# Patient Record
Sex: MALE | Race: WHITE | Employment: FULL TIME | URBAN - METROPOLITAN AREA
[De-identification: names, ages, dates, MRNs, and addresses within clinical notes are randomized per-mention and may not be internally consistent; named-entity substitution may affect disease eponyms.]

---

## 2017-06-29 ENCOUNTER — OFFICE VISIT (OUTPATIENT)
Dept: URGENT CARE | Facility: URGENT CARE | Age: 27
End: 2017-06-29
Payer: COMMERCIAL

## 2017-06-29 VITALS
OXYGEN SATURATION: 99 % | TEMPERATURE: 98.1 F | DIASTOLIC BLOOD PRESSURE: 96 MMHG | WEIGHT: 187 LBS | SYSTOLIC BLOOD PRESSURE: 150 MMHG | HEART RATE: 82 BPM

## 2017-06-29 DIAGNOSIS — B85.3: Primary | ICD-10-CM

## 2017-06-29 DIAGNOSIS — Z11.3 SCREEN FOR STD (SEXUALLY TRANSMITTED DISEASE): ICD-10-CM

## 2017-06-29 PROCEDURE — 36415 COLL VENOUS BLD VENIPUNCTURE: CPT | Performed by: NURSE PRACTITIONER

## 2017-06-29 PROCEDURE — 87389 HIV-1 AG W/HIV-1&-2 AB AG IA: CPT | Performed by: NURSE PRACTITIONER

## 2017-06-29 PROCEDURE — 87591 N.GONORRHOEAE DNA AMP PROB: CPT | Performed by: NURSE PRACTITIONER

## 2017-06-29 PROCEDURE — 99202 OFFICE O/P NEW SF 15 MIN: CPT | Performed by: NURSE PRACTITIONER

## 2017-06-29 PROCEDURE — 86780 TREPONEMA PALLIDUM: CPT | Performed by: NURSE PRACTITIONER

## 2017-06-29 NOTE — MR AVS SNAPSHOT
After Visit Summary   6/29/2017    Fransisco Barton    MRN: [id number]           Patient Information     Date Of Birth          1990        Visit Information        Provider Department      6/29/2017 8:30 PM Yamila Covarrubias NP [facility]        Today's Diagnoses     Pediculosis pubis    -  1    Screen for STD (sexually transmitted disease)          Care Instructions      Pubic Lice  Lice or crabs are tiny insects that are about the size of a pinhead. Pubic lice infect the groin area. They cause bite marks and itching, especially at night. Lice lay eggs called nits that look like tiny white specks. The nits stick to pubic hairs. They don t brush away or wash off. To live, adult lice must feed on blood. Lice that fall off a person will die in 1 to 2 days.  Lice are easily spread by sexual contact with an infected person. But you can also get them from sharing personal items such as clothing, towels, and bedding.  Pubic lice are almost always in the pubic area. But pubic lice can be found in any hairy area. These areas include armpits, leg hair, chest, belly (abdomen), back, eyelashes, or eyebrows.  Pubic lice symptoms include:    Crawling feeling in your hair    Itching that is caused by an allergic reaction to the saliva of the lice (itching alone doesn't mean you have lice)    Red bumps around your pubic hair    Sores from scratching    You see the lice    Tiny brown specks in your underwear (lice feces)    Blood spots in your underwear    Faint blue spots on the skin from the lice feeding  Home care  Medicine  Pubic lice require medicine to kill the lice. There are both prescription and non-prescription medicines that can be used. When using the medicine, it must be used on the whole body, including the scalp.  There is an over-the-counter medicated cream rinse that is often used to treat head lice. It is usually effective. But lice are developing some resistance to it. If you  see live lice after a second treatment, talk to your healthcare provider.  Here are some cautions:    Don't use the medicine around your eyes. If it gets in your eyes, wash your eyes out thoroughly.    Don't use it inside the nose, ear, mouth, vagina, or on the eyebrows or eyelashes.    Talk with your provider before using this if you are pregnant or breastfeeding, or if you are planning to use it on a child younger than age 2.  If your provider recommends a medicated cream rinse, use it as follows:    Wash your hair with your regular shampoo.    Rinse with water and then towel dry. The towel will need to be washed. There could be lice on it.    Let your body cool down, if the shower was hot.    Put on enough medicated cream rinse to soak the entire body except as noted above.    Rinse well after 10 minutes. Leaving it on longer won't make it work better.    Once you have washed the medicine out of the hair, you are not finished. You still need to remove the nits using a special fine-toothed comb called a nit comb. You can usually get one in a pharmacy.    Stroke the comb through 1 section of the hair at a time, from scalp to hair tip. Clean the comb after each stroke.   When treating lice and nits on the eyebrows or eyelashes:    If there aren t a lot of lice, it is sometimes possible to remove them with a nit comb    Other treatments can be done using ophthalmic-grade petrolatum ointment. This is only available by prescription. Apply this to the edge of your eyelids 3 to 4 times a day for 10 days. This kills the lice by smothering them. Don t use regular petroleum-like ointments. They can irritate the eyes.  Medicine for symptoms  Itching probably causes the most discomfort. Over-the-counter antihistamines that have diphenhydramine are sold at pharmacies and grocery stores. Use an antihistamine in pill form, not a cream. If you were not given a prescription antihistamine, then you may use an over-the-counter  version to reduce itching if large areas of the skin are involved. This medicine may make you sleepy. So use lower doses during the day and higher doses at bedtime. Some antihistamines make you less sleepy. These are a good choice for daytime use. NOTE: Don't use antihistamines with diphenhydramine if you have glaucoma or if you are a man with trouble urinating due to an enlarged prostate.  You may have been given antibiotics because a bacterial infection developed (usually from scratching, not from the lice). Take the antibiotics until they are finished. It is important to finish them even if the wound looks better. This help make sure that the infection has cleared.  General care  As you treat your head lice, also follow these steps:    Machine-wash all your personal headwear, hats, scarves, coats, bed linens, and towels in hot water.    Dry on the hot cycle of the dryer. Any clothing, bed linens, or stuffed animals that can t be washed this way should be dry-cleaned or sealed in a plastic bag for 2 weeks. Lice will die during this time.    Eckert, brushes, barrettes, hair ties, and curlers may be treated with disinfectant or rubbing alcohol and then rinsed with clean water.    If possible, vacuum all rugs, carpets, and mattresses that were used while you were infected.    Sex partners and household members should be treated at the same time to prevent re-infection.    Avoid sexual contact until rechecked by your healthcare provider to confirm that all lice are gone.  Follow-up care  Follow up with your healthcare provider, or as advised. Call your provider if you are still itchy or if you see live lice in your pubic hair 7 days after the first treatment. If you have been infected with pubic lice, you should also be checked for other sexually transmitted diseases.  When to seek medical advice  Call your healthcare provider right away if any of these occur:    Itching gets worse and is not relieved by oral  "antihistamines    Increased tenderness, swelling of the skin, or pus draining from the bite    Trouble breathing  Date Last Reviewed: 10/1/2016    2525-2864 [employer]. [address] [place] [address], [place] [address]. All rights reserved. This information is not intended as a substitute for professional medical care. Always follow your healthcare professional's instructions.                Follow-ups after your visit        Who to contact     If you have questions or need follow up information about today's clinic visit or your schedule please contact [facility] directly at [phone].  Normal or non-critical lab and imaging results will be communicated to you by NP Photonics[employer], letter or phone within 4 business days after the clinic has received the results. If you do not hear from us within 7 days, please contact the clinic through NP Photonicshart or phone. If you have a critical or abnormal lab result, we will notify you by phone as soon as possible.  Submit refill requests through [employer] or call your pharmacy and they will forward the refill request to us. Please allow 3 business days for your refill to be completed.          Additional Information About Your Visit        MyChart Information     [employer] lets you send messages to your doctor, view your test results, renew your prescriptions, schedule appointments and more. To sign up, go to [email or web address] . Click on \"Log in\" on the left side of the screen, which will take you to the Welcome page. Then click on \"Sign up Now\" on the right side of the page.     You will be asked to enter the access code listed below, as well as some personal information. Please follow the directions to create your username and password.     Your access code is: [email or web address]  Expires: 2017  8:56 PM     Your access code will  in 90 days. If you need help or a new code, please call your [facility] or [phone].        Care EveryWhere ID     " This is your Care EveryWhere ID. This could be used by other organizations to access your [facility] medical records  [id number]        Your Vitals Were     Pulse Temperature Pulse Oximetry             82 98.1  F (36.7  C) (Oral) 99%          Blood Pressure from Last 3 Encounters:   06/29/17 (!) 150/96    Weight from Last 3 Encounters:   06/29/17 187 lb (84.8 kg)   05/14/10 155 lb (70.3 kg)              We Performed the Following     Anti Treponema     HIV Antigen Antibody Combo     Neisseria gonorrhoeae PCR          Today's Medication Changes          These changes are accurate as of: 6/29/17  8:56 PM.  If you have any questions, ask your nurse or doctor.               Start taking these medicines.        Dose/Directions    permethrin 1 % Liqd   Used for:  Pediculosis pubis   Started by:  Yamila Covarrubias, BENNETT        Apply to clean, towel-dried hair, saturate hair and scalp, wash off after 10 min.   Quantity:  60 mL   Refills:  1            Where to get your medicines      These medications were sent to [facility] [address] [place] [address] [address] [facility]  [address] [facility] [address]    Hours:  24-hours Phone:  [phone]     permethrin 1 % Liqd                Primary Care Provider Office Phone # Fax #    Jeevan Goins [phone]       [facility] [address] [facility] [address]        Equal Access to Services     MICHELLE YOU AH: Hadii aad ku hadasho Soomaali, waaxda luqadaha, qaybta kaalmada adeegyada, waxay brett us . So [facility] [phone].    ATENCIÓN: Si habla español, tiene a clark disposición servicios gratuitos de asistencia lingüística. Llame al [phone].    We comply with applicable federal civil rights laws and [place] laws. We do not discriminate on the basis of race, color, national origin, age, disability sex, sexual orientation or gender identity.            Thank  you!     Thank you for choosing [facility]  for your care. Our goal is always to provide you with excellent care. Hearing back from our patients is one way we can continue to improve our services. Please take a few minutes to complete the written survey that you may receive in the mail after your visit with us. Thank you!             Your Updated Medication List - Protect others around you: Learn how to safely use, store and throw away your medicines at www.disposemymeds.org.          This list is accurate as of: 6/29/17  8:56 PM.  Always use your most recent med list.                   Brand Name Dispense Instructions for use Diagnosis    ADVIL 200 MG capsule   Generic drug:  ibuprofen      1 CAPSULE EVERY 4 TO 6 HOURS AS NEEDED        EXCEDRIN PO      prn        permethrin 1 % Liqd     60 mL    Apply to clean, towel-dried hair, saturate hair and scalp, wash off after 10 min.    Pediculosis pubis

## 2017-06-29 NOTE — LETTER
[facility]  [address]  [place] [address]      July 3, 2017      Fransisco Barton  [address] [facility] [address]        Dear Fransisco,    Your results were negative, results attached below. Please contact clinic if you have any questions or concerns at [phone].      Sincerely,  Mary Sandhu PA-C/[place]    Results for orders placed or performed in visit on 06/29/17   Anti Treponema   Result Value Ref Range    Treponema pallidum Antibody Negative NEG   HIV Antigen Antibody Combo   Result Value Ref Range    HIV Antigen Antibody Combo  NR     Nonreactive   HIV-1 p24 Ag & HIV-1/HIV-2 Ab Not Detected     Neisseria gonorrhoeae PCR   Result Value Ref Range    Specimen Descrip       Urine  CORRECTED ON 07/02 AT 1003: PREVIOUSLY REPORTED AS Midstream Urine      N Gonorrhea PCR  NEG     Negative   Negative for N. gonorrhoeae rRNA by transcription mediated amplification.   A negative result by transcription mediated amplification does not preclude the   presence of N. gonorrhoeae infection because results are dependent on proper   and adequate collection, absence of inhibitors, and sufficient rRNA to be   detected.

## 2017-06-30 LAB — HIV 1+2 AB+HIV1 P24 AG SERPL QL IA: NORMAL

## 2017-06-30 NOTE — NURSING NOTE
Chief Complaint   Patient presents with     STD     Started 3 days ago. Pt states that he is having symptoms of Itchiness.        Initial BP (!) 150/96  Pulse 82  Temp 98.1  F (36.7  C) (Oral)  Wt 187 lb (84.8 kg)  SpO2 99% There is no height or weight on file to calculate BMI.  Medication Reconciliation: complete   Murray Childs MA

## 2017-06-30 NOTE — PATIENT INSTRUCTIONS
Pubic Lice  Lice or crabs are tiny insects that are about the size of a pinhead. Pubic lice infect the groin area. They cause bite marks and itching, especially at night. Lice lay eggs called nits that look like tiny white specks. The nits stick to pubic hairs. They don t brush away or wash off. To live, adult lice must feed on blood. Lice that fall off a person will die in 1 to 2 days.  Lice are easily spread by sexual contact with an infected person. But you can also get them from sharing personal items such as clothing, towels, and bedding.  Pubic lice are almost always in the pubic area. But pubic lice can be found in any hairy area. These areas include armpits, leg hair, chest, belly (abdomen), back, eyelashes, or eyebrows.  Pubic lice symptoms include:    Crawling feeling in your hair    Itching that is caused by an allergic reaction to the saliva of the lice (itching alone doesn't mean you have lice)    Red bumps around your pubic hair    Sores from scratching    You see the lice    Tiny brown specks in your underwear (lice feces)    Blood spots in your underwear    Faint blue spots on the skin from the lice feeding  Home care  Medicine  Pubic lice require medicine to kill the lice. There are both prescription and non-prescription medicines that can be used. When using the medicine, it must be used on the whole body, including the scalp.  There is an over-the-counter medicated cream rinse that is often used to treat head lice. It is usually effective. But lice are developing some resistance to it. If you see live lice after a second treatment, talk to your healthcare provider.  Here are some cautions:    Don't use the medicine around your eyes. If it gets in your eyes, wash your eyes out thoroughly.    Don't use it inside the nose, ear, mouth, vagina, or on the eyebrows or eyelashes.    Talk with your provider before using this if you are pregnant or breastfeeding, or if you are planning to use it on a  child younger than age 2.  If your provider recommends a medicated cream rinse, use it as follows:    Wash your hair with your regular shampoo.    Rinse with water and then towel dry. The towel will need to be washed. There could be lice on it.    Let your body cool down, if the shower was hot.    Put on enough medicated cream rinse to soak the entire body except as noted above.    Rinse well after 10 minutes. Leaving it on longer won't make it work better.    Once you have washed the medicine out of the hair, you are not finished. You still need to remove the nits using a special fine-toothed comb called a nit comb. You can usually get one in a pharmacy.    Stroke the comb through 1 section of the hair at a time, from scalp to hair tip. Clean the comb after each stroke.   When treating lice and nits on the eyebrows or eyelashes:    If there aren t a lot of lice, it is sometimes possible to remove them with a nit comb    Other treatments can be done using ophthalmic-grade petrolatum ointment. This is only available by prescription. Apply this to the edge of your eyelids 3 to 4 times a day for 10 days. This kills the lice by smothering them. Don t use regular petroleum-like ointments. They can irritate the eyes.  Medicine for symptoms  Itching probably causes the most discomfort. Over-the-counter antihistamines that have diphenhydramine are sold at pharmacies and grocery stores. Use an antihistamine in pill form, not a cream. If you were not given a prescription antihistamine, then you may use an over-the-counter version to reduce itching if large areas of the skin are involved. This medicine may make you sleepy. So use lower doses during the day and higher doses at bedtime. Some antihistamines make you less sleepy. These are a good choice for daytime use. NOTE: Don't use antihistamines with diphenhydramine if you have glaucoma or if you are a man with trouble urinating due to an enlarged prostate.  You may have been  given antibiotics because a bacterial infection developed (usually from scratching, not from the lice). Take the antibiotics until they are finished. It is important to finish them even if the wound looks better. This help make sure that the infection has cleared.  General care  As you treat your head lice, also follow these steps:    Machine-wash all your personal headwear, hats, scarves, coats, bed linens, and towels in hot water.    Dry on the hot cycle of the dryer. Any clothing, bed linens, or stuffed animals that can t be washed this way should be dry-cleaned or sealed in a plastic bag for 2 weeks. Lice will die during this time.    Eckert, brushes, barrettes, hair ties, and curlers may be treated with disinfectant or rubbing alcohol and then rinsed with clean water.    If possible, vacuum all rugs, carpets, and mattresses that were used while you were infected.    Sex partners and household members should be treated at the same time to prevent re-infection.    Avoid sexual contact until rechecked by your healthcare provider to confirm that all lice are gone.  Follow-up care  Follow up with your healthcare provider, or as advised. Call your provider if you are still itchy or if you see live lice in your pubic hair 7 days after the first treatment. If you have been infected with pubic lice, you should also be checked for other sexually transmitted diseases.  When to seek medical advice  Call your healthcare provider right away if any of these occur:    Itching gets worse and is not relieved by oral antihistamines    Increased tenderness, swelling of the skin, or pus draining from the bite    Trouble breathing  Date Last Reviewed: 10/1/2016    5170-6278 [employer]. [address] [place] [address], [place] [address]. All rights reserved. This information is not intended as a substitute for professional medical care. Always follow your healthcare professional's instructions.

## 2017-07-02 LAB
N GONORRHOEA DNA SPEC QL NAA+PROBE: NORMAL
SPECIMEN SOURCE: NORMAL
T PALLIDUM IGG+IGM SER QL: NEGATIVE

## 2018-05-08 ENCOUNTER — OFFICE VISIT (OUTPATIENT)
Dept: FAMILY MEDICINE | Facility: CLINIC | Age: 28
End: 2018-05-08
Payer: COMMERCIAL

## 2018-05-08 VITALS
WEIGHT: 193 LBS | BODY MASS INDEX: 27.63 KG/M2 | HEART RATE: 81 BPM | TEMPERATURE: 97.6 F | SYSTOLIC BLOOD PRESSURE: 130 MMHG | HEIGHT: 70 IN | DIASTOLIC BLOOD PRESSURE: 80 MMHG

## 2018-05-08 DIAGNOSIS — Z11.3 SCREEN FOR STD (SEXUALLY TRANSMITTED DISEASE): ICD-10-CM

## 2018-05-08 DIAGNOSIS — Z00.00 ROUTINE GENERAL MEDICAL EXAMINATION AT A HEALTH CARE FACILITY: Primary | ICD-10-CM

## 2018-05-08 DIAGNOSIS — Z29.9 PROPHYLACTIC MEASURE: ICD-10-CM

## 2018-05-08 DIAGNOSIS — Z23 NEED FOR TDAP VACCINATION: ICD-10-CM

## 2018-05-08 PROCEDURE — 99395 PREV VISIT EST AGE 18-39: CPT | Mod: 25 | Performed by: FAMILY MEDICINE

## 2018-05-08 PROCEDURE — 86695 HERPES SIMPLEX TYPE 1 TEST: CPT | Performed by: FAMILY MEDICINE

## 2018-05-08 PROCEDURE — 87389 HIV-1 AG W/HIV-1&-2 AB AG IA: CPT | Performed by: FAMILY MEDICINE

## 2018-05-08 PROCEDURE — 87491 CHLMYD TRACH DNA AMP PROBE: CPT | Performed by: FAMILY MEDICINE

## 2018-05-08 PROCEDURE — 86696 HERPES SIMPLEX TYPE 2 TEST: CPT | Performed by: FAMILY MEDICINE

## 2018-05-08 PROCEDURE — 90715 TDAP VACCINE 7 YRS/> IM: CPT | Performed by: FAMILY MEDICINE

## 2018-05-08 PROCEDURE — 86803 HEPATITIS C AB TEST: CPT | Performed by: FAMILY MEDICINE

## 2018-05-08 PROCEDURE — 86780 TREPONEMA PALLIDUM: CPT | Performed by: FAMILY MEDICINE

## 2018-05-08 PROCEDURE — 36415 COLL VENOUS BLD VENIPUNCTURE: CPT | Performed by: FAMILY MEDICINE

## 2018-05-08 PROCEDURE — 90471 IMMUNIZATION ADMIN: CPT | Performed by: FAMILY MEDICINE

## 2018-05-08 PROCEDURE — 87591 N.GONORRHOEAE DNA AMP PROB: CPT | Performed by: FAMILY MEDICINE

## 2018-05-08 ASSESSMENT — PAIN SCALES - GENERAL: PAINLEVEL: NO PAIN (0)

## 2018-05-08 NOTE — MR AVS SNAPSHOT
After Visit Summary   5/8/2018    Fransisco Barton    MRN: [id number]           Patient Information     Date Of Birth          1990        Visit Information        Provider Department      5/8/2018 11:20 AM Jeevan Goins MD [facility]        Today's Diagnoses     Need for Tdap vaccination    -  1    Screen for STD (sexually transmitted disease)        Prophylactic measure          Care Instructions      Preventive Health Recommendations  Male Ages 26 - 39    Yearly exam:             See your health care provider every year in order to  o   Review health changes.   o   Discuss preventive care.    o   Review your medicines if your doctor has prescribed any.    You should be tested each year for STDs (sexually transmitted diseases), if you re at risk.     After age 35, talk to your provider about cholesterol testing. If you are at risk for heart disease, have your cholesterol tested at least every 5 years.     If you are at risk for diabetes, you should have a diabetes test (fasting glucose).  Shots: Get a flu shot each year. Get a tetanus shot every 10 years.     Nutrition:    Eat at least 5 servings of fruits and vegetables daily.     Eat whole-grain bread, whole-wheat pasta and brown rice instead of white grains and rice.     Talk to your provider about Calcium and Vitamin D.     Lifestyle    Exercise for at least 150 minutes a week (30 minutes a day, 5 days a week). This will help you control your weight and prevent disease.     Limit alcohol to one drink per day.     No smoking.     Wear sunscreen to prevent skin cancer.     See your dentist every six months for an exam and cleaning.       Advise dental appointment    We will send you lab results    Keep working on healthy diet/exercise     Eye exam    If desired can call for appointment at infectious disease clinic            Follow-ups after your visit        Additional Services     INFECTIOUS DISEASE REFERRAL        "Your provider has referred you to: [facility]: [facility] (Infectious Disease and HIV Clinic) - [place] [phone]   [email or web address]    Please be aware that coverage of these services is subject to the terms and limitations of your health insurance plan.  Call member services at your health plan with any benefit or coverage questions.      Please bring the following with you to your appointment:    (1) Any X-Rays, CTs or MRIs which have been performed.  Contact the facility where they were done to arrange for  prior to your scheduled appointment.    (2) List of current medications   (3) This referral request   (4) Any documents/labs given to you for this referral                  Who to contact     If you have questions or need follow up information about today's clinic visit or your schedule please contact [facility] directly at [phone].  Normal or non-critical lab and imaging results will be communicated to you by MyChart, letter or phone within 4 business days after the clinic has received the results. If you do not hear from us within 7 days, please contact the clinic through [employer] or phone. If you have a critical or abnormal lab result, we will notify you by phone as soon as possible.  Submit refill requests through [employer] or call your pharmacy and they will forward the refill request to us. Please allow 3 business days for your refill to be completed.          Additional Information About Your Visit        Sun[employer] Information     [employer] lets you send messages to your doctor, view your test results, renew your prescriptions, schedule appointments and more. To sign up, go to [email or web address] . Click on \"Log in\" on the left side of the screen, which will take you to the Welcome page. Then click on \"Sign up Now\" on the right side of the page.     You will be asked to enter the access code listed " "below, as well as some personal information. Please follow the directions to create your username and password.     Your access code is: [email or web address]  Expires: 2018 11:42 AM     Your access code will  in 90 days. If you need help or a new code, please call your [place] clinic or [phone].        Care EveryWhere ID     This is your Care EveryWhere ID. This could be used by other organizations to access your [place] medical records  [id number]        Your Vitals Were     Pulse Temperature Height BMI (Body Mass Index)          81 97.6  F (36.4  C) (Oral) 5' 9.78\" (1.772 m) 27.87 kg/m2         Blood Pressure from Last 3 Encounters:   18 130/80   17 (!) 150/96    Weight from Last 3 Encounters:   18 193 lb (87.5 kg)   17 187 lb (84.8 kg)   05/14/10 155 lb (70.3 kg)              We Performed the Following     Chlamydia trachomatis PCR     Hepatitis C antibody     Herpes Simplex Virus 1 and 2 IgG     HIV Antigen Antibody Combo     INFECTIOUS DISEASE REFERRAL     Neisseria gonorrhoeae PCR     TDAP VACCINE (ADACEL)     Treponema Abs w Reflex to RPR and Titer     VACCINE ADMINISTRATION, INITIAL        Primary Care Provider Office Phone # Fax #    Jeevan Goins [phone]       [address] [facility] [address]        Equal Access to Services     [facility] AH: Hadii aad ku hadasho Soomaali, waaxda luqadaha, qaybta kaalmada adeegyada, kenny us . So [facility] [phone].    ATENCIÓN: Si habla español, tiene a clark disposición servicios gratuitos de asistencia lingüística. Llame al [phone].    We comply with applicable federal civil rights laws and [place] laws. We do not discriminate on the basis of race, color, national origin, age, disability, sex, sexual orientation, or gender identity.            Thank you!     Thank you for choosing [facility]  for your care. Our goal is always to provide you " with excellent care. Hearing back from our patients is one way we can continue to improve our services. Please take a few minutes to complete the written survey that you may receive in the mail after your visit with us. Thank you!             Your Updated Medication List - Protect others around you: Learn how to safely use, store and throw away your medicines at www.disposemymeds.org.          This list is accurate as of 5/8/18 11:42 AM.  Always use your most recent med list.                   Brand Name Dispense Instructions for use Diagnosis    ADVIL 200 MG capsule   Generic drug:  ibuprofen      1 CAPSULE EVERY 4 TO 6 HOURS AS NEEDED        EXCEDRIN PO      prn        nexIUM 20 MG CR capsule   Generic drug:  esomeprazole      Take 20 mg by mouth every morning (before breakfast) Take 30-60 minutes before eating.

## 2018-05-08 NOTE — PROGRESS NOTES
SUBJECTIVE:   CC: Fransisco Barton is an 27 year old male who presents for preventative health visit.     Healthy Habits:    Do you get at least three servings of calcium containing foods daily (dairy, green leafy vegetables, etc.)? yes    Amount of exercise or daily activities, outside of work: 1-2 day(s) per week    Problems taking medications regularly No    Medication side effects: No    Have you had an eye exam in the past two years? no    Do you see a dentist twice per year? no    Do you have sleep apnea, excessive snoring or daytime drowsiness?no       STD screening    No symptoms of stds    No history of stds except crabs    Otherwise feeling well        Today's PHQ-2 Score:   PHQ-2 ( 1999 Pfizer) 5/8/2018   Q1: Little interest or pleasure in doing things 0   Q2: Feeling down, depressed or hopeless 0   PHQ-2 Score 0       Abuse: Current or Past(Physical, Sexual or Emotional)- No  Do you feel safe in your environment - Yes    Social History   Substance Use Topics     Smoking status: Former Smoker     Types: Cigarettes     Smokeless tobacco: Never Used      Comment: ocaas     Alcohol use Yes      If you drink alcohol do you typically have >3 drinks per day or >7 drinks per week? Not Applicable                      Last PSA: No results found for: PSA    Reviewed orders with patient. Reviewed health maintenance and updated orders accordingly - Yes       Reviewed and updated as needed this visit by clinical staff  Tobacco  Allergies  Meds  Med Hx  Surg Hx  Fam Hx  Soc Hx        Reviewed and updated as needed this visit by Provider            ROS:  C: NEGATIVE for fever, chills, change in weight  I: NEGATIVE for worrisome rashes, moles or lesions  E: NEGATIVE for vision changes or irritation  ENT: NEGATIVE for ear, mouth and throat problems except for allergy symptoms.  Allegra or allegra d as needed.  R: NEGATIVE for significant cough or SOB  CV: NEGATIVE for chest pain, palpitations or peripheral  "edema  GI: NEGATIVE for nausea, abdominal pain, heartburn, or change in bowel habits   male: negative for dysuria, hematuria, decreased urinary stream, erectile dysfunction, urethral discharge  M: NEGATIVE for significant arthralgias or myalgia  N: NEGATIVE for weakness, dizziness or paresthesias  P: NEGATIVE for changes in mood or affect    Has gym membership, planning to go more    Last eye exam 2014        OBJECTIVE:   /80 (BP Location: Right arm, Patient Position: Chair, Cuff Size: Adult Regular)  Pulse 81  Temp 97.6  F (36.4  C) (Oral)  Ht 5' 9.78\" (1.772 m)  Wt 193 lb (87.5 kg)  BMI 27.87 kg/m2  EXAM:  GENERAL: healthy, alert and no distress  EYES: Eyes grossly normal to inspection, PERRL and conjunctivae and sclerae normal  HENT: ear canals and TM's normal, nose and mouth without ulcers or lesions  NECK: no adenopathy, no asymmetry, masses, or scars and thyroid normal to palpation  RESP: lungs clear to auscultation - no rales, rhonchi or wheezes  CV: regular rate and rhythm, normal S1 S2, no S3 or S4, no murmur, click or rub, no peripheral edema and peripheral pulses strong  ABDOMEN: soft, nontender, no hepatosplenomegaly, no masses and bowel sounds normal  MS: no gross musculoskeletal defects noted, no edema  SKIN: no suspicious lesions or rashes  NEURO: Normal strength and tone, mentation intact and speech normal  PSYCH: mentation appears normal, affect normal/bright    ASSESSMENT/PLAN:   Fransisco was seen today for physical and health maintenance.    Diagnoses and all orders for this visit:    Routine general medical examination at a health care facility    Need for Tdap vaccination  -     TDAP VACCINE (ADACEL)  -     VACCINE ADMINISTRATION, INITIAL    Screen for STD (sexually transmitted disease)  -     Chlamydia trachomatis PCR  -     Neisseria gonorrhoeae PCR  -     Hepatitis C antibody  -     HIV Antigen Antibody Combo  -     Herpes Simplex Virus 1 and 2 IgG  -     Treponema Abs w Reflex to " "RPR and Titer  -     INFECTIOUS DISEASE REFERRAL    Prophylactic measure  -     INFECTIOUS DISEASE REFERRAL    Other orders  -     Cancel: HIV Antigen Antibody Combo    Discussed multiple issues with patient  He is interested in propylactic meds to prevent hiv tranmission  Advised he see inf dis specialist for this. Referral done.  Patient to call and schedule.  tdap given.  Check labs  Patient to schedule dental and eye exams.  Keep working on healthy diet/exercise     COUNSELING:  Reviewed preventive health counseling, as reflected in patient instructions       Regular exercise       Healthy diet/nutrition       Vision screening       Safe sex practices/STD prevention    BP Screening:   Last 3 BP Readings:    BP Readings from Last 3 Encounters:   05/08/18 130/80   06/29/17 (!) 150/96       The following was recommended to the patient:  Re-screen BP within a year and recommended lifestyle modifications   reports that he has quit smoking. His smoking use included Cigarettes. He has never used smokeless tobacco.    Estimated body mass index is 27.87 kg/(m^2) as calculated from the following:    Height as of this encounter: 5' 9.78\" (1.772 m).    Weight as of this encounter: 193 lb (87.5 kg).   Weight management plan: Discussed healthy diet and exercise guidelines and patient will follow up in 12 months in clinic to re-evaluate.    Counseling Resources:  ATP IV Guidelines  Pooled Cohorts Equation Calculator  FRAX Risk Assessment  ICSI Preventive Guidelines  Dietary Guidelines for Americans, 2010  USDA's MyPlate  ASA Prophylaxis  Lung CA Screening    Jeevan Goins MD  [facility]  "

## 2018-05-08 NOTE — PATIENT INSTRUCTIONS
Preventive Health Recommendations  Male Ages 26 - 39    Yearly exam:             See your health care provider every year in order to  o   Review health changes.   o   Discuss preventive care.    o   Review your medicines if your doctor has prescribed any.    You should be tested each year for STDs (sexually transmitted diseases), if you re at risk.     After age 35, talk to your provider about cholesterol testing. If you are at risk for heart disease, have your cholesterol tested at least every 5 years.     If you are at risk for diabetes, you should have a diabetes test (fasting glucose).  Shots: Get a flu shot each year. Get a tetanus shot every 10 years.     Nutrition:    Eat at least 5 servings of fruits and vegetables daily.     Eat whole-grain bread, whole-wheat pasta and brown rice instead of white grains and rice.     Talk to your provider about Calcium and Vitamin D.     Lifestyle    Exercise for at least 150 minutes a week (30 minutes a day, 5 days a week). This will help you control your weight and prevent disease.     Limit alcohol to one drink per day.     No smoking.     Wear sunscreen to prevent skin cancer.     See your dentist every six months for an exam and cleaning.       Advise dental appointment    We will send you lab results    Keep working on healthy diet/exercise     Eye exam    If desired can call for appointment at infectious disease clinic

## 2018-05-08 NOTE — LETTER
[facility]  [address] [place]  [address]  [phone]        May 10, 2018    Fransisco ERIS Pitershantell  [address] [facility]  [place] [address]        Dear Fransisco,    No evidence of STDs.     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   It is the common virus that causes cold sores.  You do not show evidence of exposure to HSV2, which is the STD herpes.       Results for orders placed or performed in visit on 05/08/18   Hepatitis C antibody   Result Value Ref Range    Hepatitis C Antibody Nonreactive NR^Nonreactive   HIV Antigen Antibody Combo   Result Value Ref Range    HIV Antigen Antibody Combo Nonreactive NR^Nonreactive       Herpes Simplex Virus 1 and 2 IgG   Result Value Ref Range    Herpes Simplex Virus Type 1 IgG 8.0 (H) 0.0 - 0.8 AI    Herpes Simplex Virus Type 2 IgG <0.2 0.0 - 0.8 AI   Treponema Abs w Reflex to RPR and Titer   Result Value Ref Range    Treponema Antibodies Nonreactive NR^Nonreactive   Chlamydia trachomatis PCR   Result Value Ref Range    Specimen Description Urine     Chlamydia Trachomatis PCR Negative NEG^Negative   Neisseria gonorrhoeae PCR   Result Value Ref Range    Specimen Descrip Urine     N Gonorrhea PCR Negative NEG^Negative     If you have any questions please call the clinic at [phone].    Sincerely,    Jeevan Goins MD  SK

## 2018-05-08 NOTE — NURSING NOTE
Prior to injection verified patient identity using patient's name and date of birth.  Due to injection administration, patient instructed to remain in clinic for 15 minutes  afterwards, and to report any adverse reaction to me immediately.  Screening Questionnaire for Adult Immunization    Are you sick today?   No   Do you have allergies to medications, food, a vaccine component or latex?   No   Have you ever had a serious reaction after receiving a vaccination?   No   Do you have a long-term health problem with heart disease, lung disease, asthma, kidney disease, metabolic disease (e.g. diabetes), anemia, or other blood disorder?   No   Do you have cancer, leukemia, HIV/AIDS, or any other immune system problem?   No   In the past 3 months, have you taken medications that affect  your immune system, such as prednisone, other steroids, or anticancer drugs; drugs for the treatment of rheumatoid arthritis, Crohn s disease, or psoriasis; or have you had radiation treatments?   No   Have you had a seizure, or a brain or other nervous system problem?   No   During the past year, have you received a transfusion of blood or blood     products, or been given immune (gamma) globulin or antiviral drug?   No   For women: Are you pregnant or is there a chance you could become        pregnant during the next month?   No   Have you received any vaccinations in the past 4 weeks?   No     Immunization questionnaire answers were all negative.        Per orders of Dr. Goins, injection of Adacel given by Amrita Holm. Patient instructed to remain in clinic for 15 minutes afterwards, and to report any adverse reaction to me immediately.       Screening performed by Amrita Holm on 5/8/2018 at 11:51 AM.

## 2018-05-09 LAB
C TRACH DNA SPEC QL NAA+PROBE: NEGATIVE
HCV AB SERPL QL IA: NONREACTIVE
HIV 1+2 AB+HIV1 P24 AG SERPL QL IA: NONREACTIVE
HSV1 IGG SERPL QL IA: 8 AI (ref 0–0.8)
HSV2 IGG SERPL QL IA: <0.2 AI (ref 0–0.8)
N GONORRHOEA DNA SPEC QL NAA+PROBE: NEGATIVE
SPECIMEN SOURCE: NORMAL
SPECIMEN SOURCE: NORMAL
T PALLIDUM AB SER QL: NONREACTIVE

## 2018-05-10 NOTE — PROGRESS NOTES
No evidence of STDs.    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   It is the common virus that causes cold sores.  You do not show evidence of exposure to HSV2, which is the STD herpes.      Jeevan Goins MD

## 2019-03-12 ENCOUNTER — OFFICE VISIT (OUTPATIENT)
Dept: FAMILY MEDICINE | Facility: CLINIC | Age: 29
End: 2019-03-12
Payer: COMMERCIAL

## 2019-03-12 VITALS
SYSTOLIC BLOOD PRESSURE: 135 MMHG | DIASTOLIC BLOOD PRESSURE: 85 MMHG | BODY MASS INDEX: 28.63 KG/M2 | OXYGEN SATURATION: 95 % | HEART RATE: 76 BPM | WEIGHT: 200 LBS | TEMPERATURE: 97 F | HEIGHT: 70 IN

## 2019-03-12 DIAGNOSIS — M54.50 ACUTE BILATERAL LOW BACK PAIN WITHOUT SCIATICA: Primary | ICD-10-CM

## 2019-03-12 PROCEDURE — 99213 OFFICE O/P EST LOW 20 MIN: CPT | Performed by: FAMILY MEDICINE

## 2019-03-12 RX ORDER — CYCLOBENZAPRINE HCL 10 MG
10 TABLET ORAL
Qty: 15 TABLET | Refills: 0 | Status: SHIPPED | OUTPATIENT
Start: 2019-03-12

## 2019-03-12 ASSESSMENT — PAIN SCALES - GENERAL: PAINLEVEL: MODERATE PAIN (4)

## 2019-03-12 ASSESSMENT — MIFFLIN-ST. JEOR: SCORE: 1873.44

## 2019-03-12 NOTE — PATIENT INSTRUCTIONS
Patient Education     Exercises to Strengthen Your Lower Back  Strong lower back and abdominal muscles work together to support your spine. The exercises below will help strengthen the lower back. It is important that you begin exercising slowly and increase levels gradually.  Always begin any exercise program with stretching. If you feel pain while doing any of these exercises, stop and talk to your doctor about a more specific exercise program that better suits your condition.   Low back stretch  The point of stretching is to make you more flexible and increase your range of motion. Stretch only as much as you are able. Stretch slowly. Do not push your stretch to the limit. If at any point you feel pain while stretching, this is your (temporary) limit.    Lie on your back with your knees bent and both feet on the ground.    Slowly raise your left knee to your chest as you flatten your lower back against the floor. Hold for 5 seconds.    Relax and repeat the exercise with your right knee.    Do 10 of these exercises for each leg.    Repeat hugging both knees to your chest at the same time.  Building lower back strength  Start your exercise routine with 10 to 30 minutes a day, 1 to 3 times a day.  Initial exercises  Lying on your back:  1. Ankle pumps: Move your foot up and down, towards your head, and then away. Repeat 10 times with each foot.  2. Heel slides: Slowly bend your knee, drawing the heel of your foot towards you. Then slide your heel/foot from you, straightening your knee. Do not lift your foot off the floor (this is not a leg lift).  3. Abdominal contraction: Bend your knees and put your hands on your stomach. Tighten your stomach muscles. Hold for 5 seconds, then relax. Repeat 10 times.  4. Straight leg raise: Bend one leg at the knee and keep the other leg straight. Tighten your stomach muscles. Slowly lift your straight leg 6 to 12 inches off the floor and hold for up to 5 seconds. Repeat 10 times  on each side.  Standin. Wall squats: Stand with your back against the wall. Move your feet about 12 inches away from the wall. Tighten your stomach muscles, and slowly bend your knees until they are at about a 45 degree angle. Do not go down too far. Hold about 5 seconds. Then slowly return to your starting position. Repeat 10 times.  2. Heel raises: Stand facing the wall. Slowly raise the heels of your feet up and down, while keeping your toes on the floor. If you have trouble balancing, you can touch the wall with your hands. Repeat 10 times.  More advanced exercises  When you feel comfortable enough, try these exercises.  1. Kneeling lumbar extension: Begin on your hands and knees. At the same time, raise and straighten your right arm and left leg until they are parallel to the ground. Hold for 2 seconds and come back slowly to a starting position. Repeat with left arm and right leg, alternating 10 times.  2. Prone lumbar extension: Lie face down, arms extended overhead, palms on the floor. At the same time, raise your right arm and left leg as high as comfortably possible. Hold for 10 seconds and slowly return to start. Repeat with left arm and right leg, alternating 10 times. Gradually build up to 20 times. (Advanced: Repeat this exercise raising both arms and both legs a few inches off the floor at the same time. Hold for 5 seconds and release.)  3. Pelvic tilt: Lie on the floor on your back with your knees bent at 90 degrees. Your feet should be flat on the floor. Inhale, exhale, then slowly contract your abdominal muscles bringing your navel toward your spine. Let your pelvis rock back until your lower back is flat on the floor. Hold for 10 seconds while breathing smoothly.  4. Abdominal crunch: Perform a pelvic tilt (above) flattening your lower back against the floor. Holding the tension in your abdominal muscles, take another breath and raise your shoulder blades off the ground (this is not a full  sit-up). Keep your head in line with your body (don t bend your neck forward). Hold for 2 seconds, then slowly lower.  Date Last Reviewed: 6/1/2016 2000-2018 [employer]. [address] [place] [address], [place] [address]. All rights reserved. This information is not intended as a substitute for professional medical care. Always follow your healthcare professional's instructions.

## 2019-03-12 NOTE — PROGRESS NOTES
SUBJECTIVE:   Fransisco Barton is a 28 year old male who presents to clinic today for the following health issues:    Back Pain       Duration: A little over a week        Specific cause: none    Description:   Location of pain: low back mostly in the center  Character of pain: dull ache  Pain radiation:none  New numbness or weakness in legs, not attributed to pain:  no     Intensity: Currently 4/10    History:   Pain interferes with job: YES  History of back problems: no prior back problems  Any previous MRI or X-rays: None  Sees a specialist for back pain:  No  Therapies tried without relief: Ibuprofen and acetaminophen (Tylenol)    Alleviating factors:   Improved by: OTC takes the edge off      Precipitating factors:  Worsened by: Sitting for long periods of time, standing up and walking    Marisol Ferro MA        Accompanying Signs & Symptoms:  Risk of Fracture:  None  Risk of Cauda Equina:  None  Risk of Infection:  None  Risk of Cancer:  None  Risk of Ankylosing Spondylitis:  Onset at age <35, male, AND morning back stiffness. no        Patient noticed onset of back pain about a week ago.  Has not really gotten better.  He reports he was sitting on the floor when his buttocks started to get a little numb so he got up and after that noticed stiffness.  Since then he is noticed increased stiffness in the back and difficulty straightening up if he has been sitting for a period of time.  It actually gets better if he is up and active and moving.  The only family history of back problems a spinal stenosis in his grandmother.        Problem list and histories reviewed & adjusted, as indicated.  Additional history: as documented    Patient Active Problem List   Diagnosis     CARDIOVASCULAR SCREENING; LDL GOAL LESS THAN 160     Past Surgical History:   Procedure Laterality Date     NO HISTORY OF SURGERY         Social History     Tobacco Use     Smoking status: Former Smoker     Types: Cigarettes     Smokeless  "tobacco: Never Used     Tobacco comment: ocaas   Substance Use Topics     Alcohol use: Yes     Comment: once a month     Family History   Problem Relation Age of Onset     Coronary Artery Disease Father         MI early  50s         Current Outpatient Medications   Medication Sig Dispense Refill     ADVIL 200 MG PO CAPS 1 CAPSULE EVERY 4 TO 6 HOURS AS NEEDED       cyclobenzaprine (FLEXERIL) 10 MG tablet Take 1 tablet (10 mg) by mouth nightly as needed for muscle spasms 15 tablet 0     esomeprazole (NEXIUM) 20 MG CR capsule Take 20 mg by mouth every morning (before breakfast) Take 30-60 minutes before eating.       EXCEDRIN OR prn       No Known Allergies    Reviewed and updated as needed this visit by clinical staff  Tobacco  Allergies  Meds  Problems  Med Hx  Surg Hx  Fam Hx  Soc Hx        Reviewed and updated as needed this visit by Provider  Tobacco  Meds  Problems  Med Hx  Surg Hx  Fam Hx  Soc Hx        ROS:  Constitutional, HEENT, cardiovascular, pulmonary, gi and gu systems are negative, except as otherwise noted.    OBJECTIVE:     /85 (BP Location: Left arm, Patient Position: Chair, Cuff Size: Adult Large)   Pulse 76   Temp 97  F (36.1  C) (Oral)   Ht 1.778 m (5' 10\")   Wt 90.7 kg (200 lb)   SpO2 95%   BMI 28.70 kg/m    Body mass index is 28.7 kg/m .  GENERAL: healthy, alert and no distress  EYES: Eyes grossly normal to inspection, PERRL and conjunctivae and sclerae normal  MS: no gross musculoskeletal defects noted, no edema  SKIN: no suspicious lesions or rashes  NEURO: Normal strength and tone, mentation intact and speech normal  Comprehensive back pain exam:  No tenderness, Pain limits the following motions: Forward flexion to the level of the knees., Lower extremity strength functional and equal on both sides, Lower extremity reflexes within normal limits bilaterally, Lower extremity sensation normal and equal on both sides and Straight leg raise negative bilaterally  PSYCH: " mentation appears normal, affect normal/bright    Diagnostic Test Results:  none     ASSESSMENT/PLAN:             1. Acute bilateral low back pain without sciatica  Most likely mechanical back pain.  No red flags are noted on today's examination.  He could continue to use ibuprofen or naproxen as needed and I offered a as needed muscle relaxant to take before bedtime to see if this would decrease some of the morning stiffness.  Indications for follow-up were reviewed.  Information was also given regarding some back stretching exercises.  - cyclobenzaprine (FLEXERIL) 10 MG tablet; Take 1 tablet (10 mg) by mouth nightly as needed for muscle spasms  Dispense: 15 tablet; Refill: 0    See Patient Instructions    Dustin Liang MD  [facility]

## 2020-10-20 ENCOUNTER — OFFICE VISIT (OUTPATIENT)
Dept: FAMILY MEDICINE | Facility: CLINIC | Age: 30
End: 2020-10-20
Payer: COMMERCIAL

## 2020-10-20 DIAGNOSIS — Z23 NEED FOR PROPHYLACTIC VACCINATION AND INOCULATION AGAINST INFLUENZA: Primary | ICD-10-CM

## 2020-10-20 PROCEDURE — 90686 IIV4 VACC NO PRSV 0.5 ML IM: CPT | Performed by: PHYSICIAN ASSISTANT

## 2020-10-20 PROCEDURE — 90471 IMMUNIZATION ADMIN: CPT | Performed by: PHYSICIAN ASSISTANT

## 2020-10-20 PROCEDURE — 99207 PR NO CHARGE NURSE ONLY: CPT | Performed by: PHYSICIAN ASSISTANT
